# Patient Record
Sex: FEMALE | Race: ASIAN | NOT HISPANIC OR LATINO | URBAN - METROPOLITAN AREA
[De-identification: names, ages, dates, MRNs, and addresses within clinical notes are randomized per-mention and may not be internally consistent; named-entity substitution may affect disease eponyms.]

---

## 2018-03-28 ENCOUNTER — EMERGENCY (EMERGENCY)
Facility: HOSPITAL | Age: 53
LOS: 1 days | Discharge: HOME | End: 2018-03-28
Attending: EMERGENCY MEDICINE | Admitting: EMERGENCY MEDICINE
Payer: MEDICAID

## 2018-03-28 VITALS
HEART RATE: 78 BPM | RESPIRATION RATE: 16 BRPM | OXYGEN SATURATION: 100 % | SYSTOLIC BLOOD PRESSURE: 136 MMHG | TEMPERATURE: 98 F | DIASTOLIC BLOOD PRESSURE: 92 MMHG

## 2018-03-28 LAB — D DIMER BLD IA.RAPID-MCNC: < 150 NG/ML — SIGNIFICANT CHANGE UP

## 2018-03-28 PROCEDURE — 71046 X-RAY EXAM CHEST 2 VIEWS: CPT | Mod: 26

## 2018-03-28 PROCEDURE — 99285 EMERGENCY DEPT VISIT HI MDM: CPT | Mod: 25

## 2018-03-28 PROCEDURE — 93010 ELECTROCARDIOGRAM REPORT: CPT

## 2018-03-28 NOTE — ED PROVIDER NOTE - PROGRESS NOTE DETAILS
Pain significantly improved. CXR clear, EKG NSR, D-dimer negative. Family members have PMD will f/u with him this week. Given return precautions.

## 2018-03-28 NOTE — ED ADULT NURSE NOTE - OBJECTIVE STATEMENT
52yoF a&ox4 amb presents to intake c/o chest pain radiating to back beginning last night. pt recently traveled to [place] from [facility] 1 week ago. denies ever having pain like this before. denies sob, dizziness, lightheadedness, n/v/d. no hx of PE/DVT. no swelling noted to extremities. breathing even/unlabored. lab drawn. will continue to monitor.

## 2018-03-28 NOTE — ED PROVIDER NOTE - ATTENDING CONTRIBUTION TO CARE
Presented by EMS, severe chest right interscapular discomfort with chest pain, which is improved. No heavy lifting . denies abd pain syncope SOB, no symptoms currently except pain on movement of right shoulder /92 P78 RR 16 Sat 100 RA: neck supple chest clear, no pleuritic pain, tenderness interscapular right reproducing discomfort, cor RR nls1 s2 no rubs or murmurs abd soft non tender  back supple, non focal exam; no BCPs recent travel one week ago, no cords or pedal edema. Imp: musculoskeletal Pain: if CXR D-Dimer ekg negative will d/c nSAIDS

## 2018-03-28 NOTE — ED PROVIDER NOTE - OBJECTIVE STATEMENT
52F no PMH, visiting from [facility] p/w R sided upper back pain worse with deep inspiration. Nonexertional. No SOB. Worse when she moves or picks up her granddaughter. No hx of cardiac disease. No cough/hemoptysis, no leg swelling. Pain radiates to chest. Took aspirin with EMS, daughter gave her motrin which improved pain. Pain initially started yesterday, then resolved overnight, but recurred today prompting her to go to ED.

## 2018-03-28 NOTE — ED PROVIDER NOTE - MEDICAL DECISION MAKING DETAILS
52F with reproducible upper back pain with radiation to chest, intermittent x 2 days. Normal vital signs (no hypoxia or tachycardia, normotensive), well appearing with symmetric pulses and normal exam.